# Patient Record
Sex: MALE | URBAN - METROPOLITAN AREA
[De-identification: names, ages, dates, MRNs, and addresses within clinical notes are randomized per-mention and may not be internally consistent; named-entity substitution may affect disease eponyms.]

---

## 2024-07-10 ENCOUNTER — APPOINTMENT (RX ONLY)
Dept: URBAN - METROPOLITAN AREA CLINIC 93 | Facility: CLINIC | Age: 64
Setting detail: DERMATOLOGY
End: 2024-07-10

## 2024-07-10 DIAGNOSIS — L53.8 OTHER SPECIFIED ERYTHEMATOUS CONDITIONS: ICD-10-CM

## 2024-07-10 PROBLEM — L30.9 DERMATITIS, UNSPECIFIED: Status: ACTIVE | Noted: 2024-07-10

## 2024-07-10 PROCEDURE — ? BIOPSY BY PUNCH METHOD

## 2024-07-10 PROCEDURE — ? PRESCRIPTION MEDICATION MANAGEMENT

## 2024-07-10 PROCEDURE — ? PRESCRIPTION

## 2024-07-10 PROCEDURE — 99204 OFFICE O/P NEW MOD 45 MIN: CPT | Mod: 25

## 2024-07-10 PROCEDURE — 11104 PUNCH BX SKIN SINGLE LESION: CPT

## 2024-07-10 PROCEDURE — ? COUNSELING

## 2024-07-10 PROCEDURE — 11105 PUNCH BX SKIN EA SEP/ADDL: CPT

## 2024-07-10 PROCEDURE — ? SEPARATE AND IDENTIFIABLE DOCUMENTATION

## 2024-07-10 RX ORDER — TRIAMCINOLONE ACETONIDE 1 MG/G
APPLY CREAM TOPICAL BID
Qty: 453 | Refills: 3 | Status: ERX | COMMUNITY
Start: 2024-07-10

## 2024-07-10 RX ADMIN — TRIAMCINOLONE ACETONIDE APPLY: 1 CREAM TOPICAL at 00:00

## 2024-07-10 ASSESSMENT — LOCATION DETAILED DESCRIPTION DERM
LOCATION DETAILED: LEFT DISTAL POSTERIOR UPPER ARM
LOCATION DETAILED: LEFT ANTERIOR PROXIMAL THIGH
LOCATION DETAILED: RIGHT ANTERIOR PROXIMAL THIGH
LOCATION DETAILED: LEFT LATERAL TRAPEZIAL NECK
LOCATION DETAILED: LEFT MEDIAL TRAPEZIAL NECK
LOCATION DETAILED: RIGHT PROXIMAL POSTERIOR UPPER ARM
LOCATION DETAILED: RIGHT SUPERIOR MEDIAL UPPER BACK

## 2024-07-10 ASSESSMENT — LOCATION SIMPLE DESCRIPTION DERM
LOCATION SIMPLE: RIGHT UPPER ARM
LOCATION SIMPLE: LEFT THIGH
LOCATION SIMPLE: POSTERIOR NECK
LOCATION SIMPLE: RIGHT UPPER BACK
LOCATION SIMPLE: RIGHT THIGH
LOCATION SIMPLE: LEFT UPPER ARM

## 2024-07-10 ASSESSMENT — LOCATION ZONE DERM
LOCATION ZONE: NECK
LOCATION ZONE: TRUNK
LOCATION ZONE: LEG
LOCATION ZONE: ARM

## 2024-07-10 ASSESSMENT — BSA RASH: BSA RASH: 90

## 2024-07-10 ASSESSMENT — SEVERITY ASSESSMENT: SEVERITY: MODERATE TO SEVERE

## 2024-07-10 NOTE — PROCEDURE: BIOPSY BY PUNCH METHOD
Detail Level: Detailed
Was A Bandage Applied: Yes
Punch Size In Mm: 4
Size Of Lesion In Cm (Optional): 0.4
X Size Of Lesion In Cm (Optional): 0
Depth Of Punch Biopsy: dermis
Biopsy Type: H and E
Anesthesia Type: 1% lidocaine with epinephrine
Anesthesia Volume In Cc: 1
Hemostasis: None
Epidermal Sutures: 4-0 Prolene
Wound Care: Petrolatum
Dressing: no dressing applied
Suture Removal: 14 days
Patient Will Remove Sutures At Home?: No
Lab: [id number]
Lab Facility: [id number]
Path Notes (To The Dermatopathologist): Please check margins
Consent: Written consent was obtained and risks were reviewed including but not limited to scarring, infection, bleeding, scabbing, incomplete removal, nerve damage and allergy to anesthesia.
Post-Care Instructions: I reviewed with the patient in detail post-care instructions. Patient is to keep the biopsy site dry overnight, and then apply bacitracin twice daily until healed. Patient may apply hydrogen peroxide soaks to remove any crusting.
Home Suture Removal Text: Patient was provided a home suture removal kit and will remove their sutures at home.  If they have any questions or difficulties they will call the office.
Notification Instructions: Patient will be notified of biopsy results. However, patient instructed to call the office if not contacted within 2 weeks.
Billing Type: Third-Party Bill
Information: Selecting Yes will display possible errors in your note based on the variables you have selected. This validation is only offered as a suggestion for you. PLEASE NOTE THAT THE VALIDATION TEXT WILL BE REMOVED WHEN YOU FINALIZE YOUR NOTE. IF YOU WANT TO FAX A PRELIMINARY NOTE YOU WILL NEED TO TOGGLE THIS TO 'NO' IF YOU DO NOT WANT IT IN YOUR FAXED NOTE.
Size Of Lesion In Cm (Optional): 0.3

## 2024-07-10 NOTE — PROCEDURE: PRESCRIPTION MEDICATION MANAGEMENT
Plan: Patient with no past medical history, medications, or history of PE/DVT was seen at [facility] ER and admitted to hospital for rash and LE edema 5/19/24 - 5/22/24 During his visit, he had numerous blood work panels, CXR, CT of head/spine, CTA chest/abd/pelvis. There was multilevel disc degeneration, mild atelecstasis on CXR, and bilateral LAD in axilla and groin. FNA biopsy with flow cytology of left axilla lymph node was also performed. Hospital discharge paperwork reports concern for lymphoproliferative disease or paraneoplastic. Few blood abnormalities, but nothing was alarmingly high or low, other than BNP (1190 - normal should be <125) but EKG and echo were normal.\\n\\nDiscussed my concerns for paraneoplastic etiology given systemic symptoms + bilateral LAD in axilla and groin + diffuse erytheroderma. Recommended re-establishing care with PCP - will send visit notes to Dr. Perez in [place]. Recommended staying up to date with age appropriate vaccines and cancer screenings (pt has never had a colonoscopy). \\n\\nHe admits to some fatigue, chills (no fevers), slight SOB, occasional paresthesias in all extremities, some fine motor difficulties. Denies orthopnea, abd pain, changes to bowel habits, N/V/D, black tarry stools, or bloody stools. Patient admits to moderate EtOH intake, has never been a smoked or used recreational drugs. He reports that he is typically fairly active, but not lately. Itching is interfering with his daily activities and sleep. He is taking several showers daily to relief itching. We discussed skincare tips (shorter, cooler showers, and frequent moisturizing - may continue to use his CeraVe).\\n\\nFollow up in 2 weeks for suture removal and treatment plan.
Render In Strict Bullet Format?: No
Initiate Treatment: triamcinolone acetonide 0.1 % topical cream - Apply twice daily to affected areas x 4 weeks
Detail Level: Zone

## 2024-07-10 NOTE — HPI: RASH
What Type Of Note Output Would You Prefer (Optional)?: Standard Output
How Severe Is Your Rash?: severe
Is This A New Presentation, Or A Follow-Up?: Rash
Additional History: Patient reports swelling in all 4 extremities, especially lower extremities. He also notes some paresthesias in limbs, particularly upper extremities.

## 2024-07-24 ENCOUNTER — APPOINTMENT (RX ONLY)
Dept: URBAN - METROPOLITAN AREA CLINIC 93 | Facility: CLINIC | Age: 64
Setting detail: DERMATOLOGY
End: 2024-07-24

## 2024-07-24 DIAGNOSIS — L53.8 OTHER SPECIFIED ERYTHEMATOUS CONDITIONS: ICD-10-CM | Status: IMPROVED

## 2024-07-24 DIAGNOSIS — Z48.02 ENCOUNTER FOR REMOVAL OF SUTURES: ICD-10-CM

## 2024-07-24 PROBLEM — L30.9 DERMATITIS, UNSPECIFIED: Status: ACTIVE | Noted: 2024-07-24

## 2024-07-24 PROCEDURE — ? SUTURE REMOVAL (GLOBAL PERIOD)

## 2024-07-24 PROCEDURE — ? PRESCRIPTION MEDICATION MANAGEMENT

## 2024-07-24 PROCEDURE — ? COUNSELING

## 2024-07-24 PROCEDURE — 99214 OFFICE O/P EST MOD 30 MIN: CPT

## 2024-07-24 PROCEDURE — ? PHOTO-DOCUMENTATION

## 2024-07-24 ASSESSMENT — LOCATION SIMPLE DESCRIPTION DERM
LOCATION SIMPLE: LEFT THIGH
LOCATION SIMPLE: RIGHT UPPER ARM
LOCATION SIMPLE: LEFT UPPER BACK
LOCATION SIMPLE: RIGHT THIGH
LOCATION SIMPLE: LEFT SHOULDER
LOCATION SIMPLE: RIGHT UPPER BACK
LOCATION SIMPLE: LEFT UPPER ARM

## 2024-07-24 ASSESSMENT — LOCATION DETAILED DESCRIPTION DERM
LOCATION DETAILED: RIGHT ANTERIOR PROXIMAL THIGH
LOCATION DETAILED: RIGHT PROXIMAL POSTERIOR UPPER ARM
LOCATION DETAILED: LEFT SUPERIOR UPPER BACK
LOCATION DETAILED: LEFT DISTAL POSTERIOR UPPER ARM
LOCATION DETAILED: LEFT ANTERIOR PROXIMAL THIGH
LOCATION DETAILED: LEFT POSTERIOR SHOULDER
LOCATION DETAILED: RIGHT SUPERIOR MEDIAL UPPER BACK

## 2024-07-24 ASSESSMENT — LOCATION ZONE DERM
LOCATION ZONE: LEG
LOCATION ZONE: ARM
LOCATION ZONE: TRUNK

## 2024-07-24 NOTE — PROCEDURE: SUTURE REMOVAL (GLOBAL PERIOD)
Detail Level: Detailed
Add 87583 Cpt? (Important Note: In 2017 The Use Of 17579 Is Being Tracked By Cms To Determine Future Global Period Reimbursement For Global Periods): no

## 2024-07-24 NOTE — PROCEDURE: PRESCRIPTION MEDICATION MANAGEMENT
Plan: Patient with no past medical history, medications, or history of PE/DVT was seen at [facility] ER and admitted to hospital for rash and LE edema 5/19/24 - 5/22/24 During his visit, he had numerous blood work panels, CXR, CT of head/spine, CTA chest/abd/pelvis. There was multilevel disc degeneration, mild atelecstasis on CXR, and bilateral LAD in axilla and groin. FNA biopsy with flow cytology of left axilla lymph node was also performed. Hospital discharge paperwork reports concern for lymphoproliferative disease or paraneoplastic. Few blood abnormalities, but nothing was alarmingly high or low, other than BNP (1190 - normal should be <125) but EKG and echo were normal.\\n\\nDiscussed my concerns for paraneoplastic etiology given systemic symptoms + bilateral LAD in axilla and inguinal areas + diffuse erytheroderma. Recommended re-establishing care with PCP - will send visit notes to Dr. Perez in [place]. Recommended staying up to date with age appropriate vaccines and cancer screenings (pt has never had a colonoscopy). \\n\\nHe admits to some fatigue, chills (no fevers), slight SOB, occasional paresthesias in all extremities, some fine motor difficulties. Denies orthopnea, abd pain, changes to bowel habits, N/V/D, black tarry stools, or bloody stools. Patient admits to moderate EtOH intake, has never been a smoked or used recreational drugs. He reports that he is typically fairly active, but not lately. Itching is interfering with his daily activities and sleep. He is taking several showers daily to relief itching. We discussed skincare tips (shorter, cooler showers, and frequent moisturizing - may continue to use his CeraVe).\\n\\nBiopsy results from 7/10/24: spongiotic dermatitis. Patient is greatly improved from 2 weeks ago with triamcinolone cream and 1 dose of lasix. No palpable LAD in axillary areas today. I discussed my previous concern of paraneoplastic disease with patient and his family again. Even though patient is less symptomatic and clinically looks less sick, I would still recommended lymph node biopsy with Dr. Jaden Glaser to R/O malignancy. Provided patient with office number for Dr. Glaser.
Render In Strict Bullet Format?: No
Detail Level: Zone
Continue Regimen: triamcinolone acetonide 0.1 % topical cream - Apply twice daily to affected areas x 4 weeks.